# Patient Record
(demographics unavailable — no encounter records)

---

## 2024-11-04 NOTE — ASSESSMENT
[FreeTextEntry1] : Ms. HARO is aware of her MRI findings.  Based on her clinical exam and location of her symptoms I feel she would benefit from occipital nerve blocks.  If no improvement then she will trial cervical epidural/medial branch blocks RFA.  I will see her back in 3 months to assess her response to pain management treatments. Patient is agreeable with our plan of care. All questions answered today.   I personally reviewed with the PA, this patient's history and physical exam findings, as documented above. I have discussed the relevant areas of concern, having direct implications to the presenting problems and illnesses, and I have personally examined all pertinent and positive and negative findings, which impact their neurosurgical treatment.  MS PB MaysC Senior Physician Assistant [facility]  Whit Dove MD FAANS Chair, Department of Neurosurgery [facility]

## 2024-11-04 NOTE — HISTORY OF PRESENT ILLNESS
[FreeTextEntry1] : Ms. HARO then today for evaluation of suboccipital pain with associated headaches.  She has a mild component of neck pain however the suboccipital symptoms are most bothersome.  Since onset of symptoms she has been treated by her chiropractor.  She is now doing physical therapy and acupuncture as well.  This is providing some temporary improvement.  An MRI of the cervical spine was reviewed today from [facility] 10/4/2024.  She is found to have multilevel cervical spondylosis with varying degrees of foraminal and central stenosis.  No evidence of cord signal changes.

## 2024-12-19 NOTE — CARDIOLOGY SUMMARY
[de-identified] : 1/9/23: LVEF 60%, DD2, E/e': 16 e' lat: 0.04 m/s, mlld LAE, mild to mod AR  [de-identified] : 5/22/23:Right: proximal ICA <50% stenosis.Left: proximal ICA <50% stenosis.\par  Vertebral arteries: antegrade flow bilaterally.Subclavian arteries: no significant atherosclerosis bilaterally.
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
## 2024-12-19 NOTE — CARDIOLOGY SUMMARY
[de-identified] : 1/9/23: LVEF 60%, DD2, E/e': 16 e' lat: 0.04 m/s, mlld LAE, mild to mod AR  [de-identified] : 5/22/23:Right: proximal ICA <50% stenosis.Left: proximal ICA <50% stenosis.\par  Vertebral arteries: antegrade flow bilaterally.Subclavian arteries: no significant atherosclerosis bilaterally.
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
## 2025-01-17 NOTE — HISTORY OF PRESENT ILLNESS
[FreeTextEntry1] : Ms. Caldera is a 79-year-old female presenting to establish care for her neck pain. The pain started about 1 year ago. When the pain started, it started as headaches. She was seen by her cardiologist who referred her to a Neurologist. She was undergoing physical therapy for many months and continues to under therapy weekly along with chiropractic therapy and acupuncture three times per month. Her headaches still persist and are daily. The headaches are in the top of her head. She was referred by Dr. Dove who did recommend for her to undergo occipital blocks. The pain is made worse when rotating the neck. Her pain is also in the left side of the neck in the occipital region. She states the headaches are daily and are rated anywhere from a 5 to an 8/10 on the pain scale. Her pain is present daily. She denies any bowel/bladder incontinence, fevers/chills, recent weight loss or any saddle anesthesia.

## 2025-01-17 NOTE — DISCUSSION/SUMMARY
[de-identified] : A discussion regarding available pain management treatment options occurred with the patient. These included interventional, rehabilitative, pharmacological, and alternative modalities. We will proceed with the following:   Interventional treatment options: 1. Proceed with a Left C2, C3, C4 medial branch facet block under fluoroscopic guidance. Treatment options were discussed with the patient. The patient has been having persistent axial neck pain that has minimally improved with conservative therapy. The patient had a positive response to the first CMBB (>80% relief for the duration of the local anesthetic) and was given the option today to proceed with an injection which could be potentially both diagnostic and therapeutic again.    If the patient has a positive response to the local anesthetic portion of the injection again and a return of pain then we can proceed with a radiofrequency ablation of the cervical medial branch nerves for potential longer-term pain relief. If the patient does not get relief we can consider other options. The risks and benefits were discussed which included bleeding, infection, nerve injury, intrathecal injection, and the side effects of steroids.  - Follow up in 2 weeks post injection.   I Mari Connolly attest that this documentation has been prepared under the direction and in the presence of provider Dr. Maynor Patino.  The documentation recorded by the scribe in my presence, accurately reflects the service I personally performed, and the decisions made by me with my edits as appropriate.   Maynor Patino, DO

## 2025-01-17 NOTE — PHYSICAL EXAM
[de-identified] : C spine  No rashes, erythema, edema noted TTP b/l cervical paraspinal and trapezius  Limited ROM with neck extension and b/l left and right rotation 2/2 to pain Positive facet loading bilaterally RUE: 5/5 strength, sensation in tact LUE: 5/5 strength, sensation in tact

## 2025-01-17 NOTE — DISCUSSION/SUMMARY
[de-identified] : A discussion regarding available pain management treatment options occurred with the patient. These included interventional, rehabilitative, pharmacological, and alternative modalities. We will proceed with the following:   Interventional treatment options: 1. Proceed with a Left C2, C3, C4 medial branch facet block under fluoroscopic guidance. Treatment options were discussed with the patient. The patient has been having persistent axial neck pain that has minimally improved with conservative therapy. The patient had a positive response to the first CMBB (>80% relief for the duration of the local anesthetic) and was given the option today to proceed with an injection which could be potentially both diagnostic and therapeutic again.    If the patient has a positive response to the local anesthetic portion of the injection again and a return of pain then we can proceed with a radiofrequency ablation of the cervical medial branch nerves for potential longer-term pain relief. If the patient does not get relief we can consider other options. The risks and benefits were discussed which included bleeding, infection, nerve injury, intrathecal injection, and the side effects of steroids.  - Follow up in 2 weeks post injection.   I Mari Connolly attest that this documentation has been prepared under the direction and in the presence of provider Dr. Maynor Patino.  The documentation recorded by the scribe in my presence, accurately reflects the service I personally performed, and the decisions made by me with my edits as appropriate.   Maynor Patino, DO

## 2025-01-17 NOTE — DATA REVIEWED
[FreeTextEntry1] : MRI of the cervical spine taken on 10-7-2024 @ [facility] radiology IMPRESSION: Severe multilevel degenerative changes and disc bulging contributing to multilevel spinal cord compression without evidence for myelomalacia. Severe multilevel bilateral foraminal narrowing. Heterogeneous enlargement of the right thyroid lobe, partially visualized which can be further evaluated dedicated thyroid ultrasound.

## 2025-01-17 NOTE — DATA REVIEWED
[FreeTextEntry1] : MRI of the cervical spine taken on 10-7-2024 @ [facility] radiology IMPRESSION: Severe multilevel degenerative changes and disc bulging contributing to multilevel spinal cord compression without evidence for myelomalacia. Severe multilevel bilateral foraminal narrowing. Heterogeneous enlargement of the right thyroid lobe, partially visualized which can be further evaluated dedicated thyroid ultrasound.

## 2025-01-17 NOTE — PHYSICAL EXAM
[de-identified] : C spine  No rashes, erythema, edema noted TTP b/l cervical paraspinal and trapezius  Limited ROM with neck extension and b/l left and right rotation 2/2 to pain Positive facet loading bilaterally RUE: 5/5 strength, sensation in tact LUE: 5/5 strength, sensation in tact

## 2025-02-10 NOTE — ASSESSMENT
[FreeTextEntry1] : Ms. HARO is a 79-year-old female with occipital neuralgia who presents for routine follow-up. Today she reports mild pain, 3/10 in severity. She is awaiting clearance from cardiology (hx of PE on Xarelto). She will continue physical therapy in the interim. No acute concerns. She will follow-up in 3 months. Advised to call office barring any changes/questions.  Rowena Disla MS FNP-BC Nurse Practitioner [facility]   Whit Dove MD FAANS Chair, Department of Neurosurgery [facility]

## 2025-02-10 NOTE — ASSESSMENT
[FreeTextEntry1] : Ms. HARO is a 79-year-old female with occipital neuralgia who presents for routine follow-up. Today she reports mild pain, 3/10 in severity. She is awaiting clearance from cardiology (hx of PE on Xarelto). She will continue physical therapy in the interim. No acute concerns. She will follow-up in 3 months. Advised to call office barring any changes/questions.  Rownea Disla MS FNP-BC Nurse Practitioner [facility]   Whit Dove MD FAANS Chair, Department of Neurosurgery [facility]

## 2025-02-10 NOTE — HISTORY OF PRESENT ILLNESS
[FreeTextEntry1] : Ms. HARO is a 79-year-old female with occipital neuralgia who presents for routine follow-up.   Today she reports mild suboccipital pain with a mild component of neck pain, 3/10 in severity. She is participating in physical therapy which has been providing relief of her symptoms.  She is pending an occipital neve block, however, is waiting on clearance from her cardiologist.  She is on Xarelto (history of PE). She states she was not able to get in touch with the office.

## 2025-05-12 NOTE — ASSESSMENT
[FreeTextEntry1] : Ms. HARO would like to trial occipital nerve blocks with pain management.  I have contacted Dr. Patino's office so she can proceed with scheduling.   In the interim she will proceed with updated imaging as per Dr. Mclain's recommendations.  I will see her back once the MRI(s) is/are completed to discuss those results.  She will continue with physical therapy chiropractic therapy in the interim.  Will call barring any issues. All questions answered today.  MS PB MaysC Senior Physician Assistant [facility]    Whit Dove MD FAANS Chair, Department of Neurosurgery [facility]

## 2025-05-12 NOTE — HISTORY OF PRESENT ILLNESS
[FreeTextEntry1] : Ms. HARO presents today for neurosurgical follow-up.    She continues to experience suboccipital pain with associated headaches. She has a mild component of neck pain solely at the base of her neck however denies radiculopathy.  She continues to undergo acupuncture and chiropractic therapy.  She finds acupuncture provides a significant amount of relief temporarily.    Upon her last office visit with pain management, January they discussed cervical medial branch blocks for possible RFA. Dr. Patino, in since she is on Xarelto she was pending clearance from her cardiologist however due to an unforeseen circumstance within that practice she has been unable to obtain this clearance letter.  It should be noted that during her prior office visits occipital nerve blocks was recommended prior to trialing cervical medial branch blocks.  She would like to proceed at this point.  Recently she was seen by Dr. Mclain (ortho) for bilateral hip pain.  She was advised that she has mild DJD in the hip/pelvis and we felt symptoms were related to possible lumbar stenosis.  Additional MRI imaging has been ordered and she is in the process of scheduling this at [facility].   DIAGNOSTIC TESTING REVIEWED:  - MRI of the cervical spine was reviewed today from [facility] 10/4/2024. She is found to have multilevel cervical spondylosis with varying degrees of foraminal and central stenosis. No evidence of cord signal changes.

## 2025-07-03 NOTE — ASSESSMENT
[FreeTextEntry1] : Ms. HARO would like to continue with acupuncture and physical/chiropractic therapy for chronic back and neck pain.  She remains hesitant in regard to interventional procedures with pain management or even surgical intervention since she would have to hold Xarelto for a short period of time.  She will follow-up with me as needed. Will call barring any issues, changes, or concerns.   MS PB MaysC Senior Physician Assistant [facility]    Whit Dove MD FAANS Chair, Department of Neurosurgery [facility]

## 2025-07-03 NOTE — HISTORY OF PRESENT ILLNESS
[FreeTextEntry1] : Ms. HARO presents with chronic neck and lower back pain secondary to know degenerative disc disease and scoliosis. She also reports referred pain into her left anterior thigh.  She is doing PT and acupuncture which provides relief. She is hesitant to proceed with injections (pain mgt) since she is on Xarelto and fears she will have another PE if she has to stop this medication for a short period of time.   DIAGNOSTIC TESTING REVIEWED: - MRI of the cervical spine was reviewed today from [facility] 10/4/2024. She is found to have multilevel cervical spondylosis with varying degrees of foraminal and central stenosis. No evidence of cord signal changes. - MRI of the lumbar spine was reviewed today from [facility] 6/5/2025. Diffuse lumbar DDD with moderate/severe thoracolumbar scoliosis. Moderate / severe Left sided foraminal stenosis L4/5 L5/S1.